# Patient Record
Sex: FEMALE | Race: WHITE | NOT HISPANIC OR LATINO | URBAN - METROPOLITAN AREA
[De-identification: names, ages, dates, MRNs, and addresses within clinical notes are randomized per-mention and may not be internally consistent; named-entity substitution may affect disease eponyms.]

---

## 2024-09-24 ENCOUNTER — TELEPHONE ENCOUNTER (OUTPATIENT)
Dept: URBAN - METROPOLITAN AREA CLINIC 54 | Facility: CLINIC | Age: 59
End: 2024-09-24

## 2024-10-03 ENCOUNTER — OFFICE VISIT (OUTPATIENT)
Dept: URBAN - METROPOLITAN AREA CLINIC 54 | Facility: CLINIC | Age: 59
End: 2024-10-03

## 2024-10-03 RX ORDER — NALTREXONE HYDROCHLORIDE 50 MG/1
1 TABLET TABLET, FILM COATED ORAL ONCE A DAY
Status: ACTIVE | COMMUNITY

## 2024-10-03 RX ORDER — ESCITALOPRAM OXALATE 20 MG/1
1 TABLET TABLET, FILM COATED ORAL ONCE A DAY
Status: ACTIVE | COMMUNITY

## 2024-10-03 RX ORDER — LOSARTAN POTASSIUM 50 MG/1
1 TABLET TABLET, FILM COATED ORAL ONCE A DAY
Status: ACTIVE | COMMUNITY

## 2024-10-11 ENCOUNTER — DASHBOARD ENCOUNTERS (OUTPATIENT)
Age: 59
End: 2024-10-11

## 2024-10-11 ENCOUNTER — LAB OUTSIDE AN ENCOUNTER (OUTPATIENT)
Dept: URBAN - METROPOLITAN AREA CLINIC 54 | Facility: CLINIC | Age: 59
End: 2024-10-11

## 2024-10-11 ENCOUNTER — OFFICE VISIT (OUTPATIENT)
Dept: URBAN - METROPOLITAN AREA CLINIC 54 | Facility: CLINIC | Age: 59
End: 2024-10-11
Payer: COMMERCIAL

## 2024-10-11 VITALS
HEIGHT: 65 IN | TEMPERATURE: 98.4 F | WEIGHT: 274 LBS | HEART RATE: 100 BPM | SYSTOLIC BLOOD PRESSURE: 132 MMHG | BODY MASS INDEX: 45.65 KG/M2 | DIASTOLIC BLOOD PRESSURE: 81 MMHG

## 2024-10-11 DIAGNOSIS — Z09 ENCOUNTER FOR COLONOSCOPY FOLLOWING COLON POLYP REMOVAL: ICD-10-CM

## 2024-10-11 DIAGNOSIS — Z86.0100 HISTORY OF COLON POLYPS: ICD-10-CM

## 2024-10-11 DIAGNOSIS — Z80.0 FAMILY HISTORY OF COLON CANCER IN MOTHER: ICD-10-CM

## 2024-10-11 PROCEDURE — 99243 OFF/OP CNSLTJ NEW/EST LOW 30: CPT

## 2024-10-11 PROCEDURE — 99203 OFFICE O/P NEW LOW 30 MIN: CPT

## 2024-10-11 RX ORDER — NALTREXONE HYDROCHLORIDE 50 MG/1
1 TABLET TABLET, FILM COATED ORAL ONCE A DAY
Status: ACTIVE | COMMUNITY

## 2024-10-11 RX ORDER — ESCITALOPRAM OXALATE 20 MG/1
1 TABLET TABLET, FILM COATED ORAL ONCE A DAY
Status: ACTIVE | COMMUNITY

## 2024-10-11 RX ORDER — SOD SULF/POT CHLORIDE/MAG SULF 1.479 G
12 TABLETS THE FIRST DOSE THE EVENING BEFORE AND SECOND DOSE THE MORNING OF COLONOSCOPY TABLET ORAL TWICE A DAY
Qty: 24 | Refills: 0 | OUTPATIENT
Start: 2024-10-11 | End: 2024-10-12

## 2024-10-11 RX ORDER — LOSARTAN POTASSIUM 50 MG/1
1 TABLET TABLET, FILM COATED ORAL ONCE A DAY
Status: ACTIVE | COMMUNITY

## 2024-10-11 NOTE — HPI-TODAY'S VISIT:
10/11/24: Patient is a 60 yo female with a PMH of HTN and anxiety/depression who was referred by [facility] for colon cancer screening. A copy of this document will be sent to the provider. Last colonoscopy was in Nov 2011 with Dr. Susan Copeland, significant for a small rectal polyp - serrated adenoma. Mother has hx of colon cancer diagnosed around age 78 (alive). Pt denies any symptoms of concern. No changes in bowel habits, melena, rectal bleeding, abdominal pain, or unintentional weight loss. No cardiopulmonary disease, no blood thinners.

## 2024-12-23 ENCOUNTER — OFFICE VISIT (OUTPATIENT)
Dept: URBAN - METROPOLITAN AREA SURGERY CENTER 14 | Facility: SURGERY CENTER | Age: 59
End: 2024-12-23

## 2025-06-18 ENCOUNTER — LAB OUTSIDE AN ENCOUNTER (OUTPATIENT)
Dept: URBAN - METROPOLITAN AREA SURGERY CENTER 14 | Facility: SURGERY CENTER | Age: 60
End: 2025-06-18

## 2025-06-25 ENCOUNTER — OFFICE VISIT (OUTPATIENT)
Dept: URBAN - METROPOLITAN AREA SURGERY CENTER 14 | Facility: SURGERY CENTER | Age: 60
End: 2025-06-25

## 2025-07-11 ENCOUNTER — TELEPHONE ENCOUNTER (OUTPATIENT)
Dept: URBAN - METROPOLITAN AREA CLINIC 54 | Facility: CLINIC | Age: 60
End: 2025-07-11

## 2025-07-15 ENCOUNTER — TELEPHONE ENCOUNTER (OUTPATIENT)
Dept: URBAN - METROPOLITAN AREA CLINIC 54 | Facility: CLINIC | Age: 60
End: 2025-07-15

## 2025-08-13 ENCOUNTER — CLAIMS CREATED FROM THE CLAIM WINDOW (OUTPATIENT)
Dept: URBAN - METROPOLITAN AREA SURGERY CENTER 14 | Facility: SURGERY CENTER | Age: 60
End: 2025-08-13
Payer: COMMERCIAL

## 2025-08-13 ENCOUNTER — CLAIMS CREATED FROM THE CLAIM WINDOW (OUTPATIENT)
Dept: URBAN - METROPOLITAN AREA CLINIC 4 | Facility: CLINIC | Age: 60
End: 2025-08-13
Payer: COMMERCIAL

## 2025-08-13 DIAGNOSIS — Z09 ENCOUNTER FOR FOLLOW-UP EXAMINATION AFTER COMPLETED TREATMENT FOR CONDITIONS OTHER THAN MALIGNANT NEOPLASM: ICD-10-CM

## 2025-08-13 DIAGNOSIS — K63.89 OTHER SPECIFIED DISEASES OF INTESTINE: ICD-10-CM

## 2025-08-13 DIAGNOSIS — Z80.0 FAMILY HISTORY OF COLON CANCER: ICD-10-CM

## 2025-08-13 DIAGNOSIS — Z80.0 FAMILY HISTORY OF COLON CANCER IN MOTHER: ICD-10-CM

## 2025-08-13 DIAGNOSIS — Z86.0100 HISTORY OF COLON POLYPS: ICD-10-CM

## 2025-08-13 DIAGNOSIS — Z86.0100 PERSONAL HISTORY OF COLON POLYPS, UNSPECIFIED: ICD-10-CM

## 2025-08-13 PROCEDURE — 45380 COLONOSCOPY AND BIOPSY: CPT | Performed by: INTERNAL MEDICINE

## 2025-08-13 PROCEDURE — 0529F INTRVL 3/>YR PTS CLNSCP DOCD: CPT | Performed by: INTERNAL MEDICINE

## 2025-08-13 PROCEDURE — 00811 ANES LWR INTST NDSC NOS: CPT | Performed by: NURSE ANESTHETIST, CERTIFIED REGISTERED

## 2025-08-13 PROCEDURE — 88305 TISSUE EXAM BY PATHOLOGIST: CPT | Performed by: PATHOLOGY

## 2025-08-13 RX ORDER — ESCITALOPRAM OXALATE 20 MG/1
1 TABLET TABLET, FILM COATED ORAL ONCE A DAY
Status: ACTIVE | COMMUNITY

## 2025-08-13 RX ORDER — LOSARTAN POTASSIUM 50 MG/1
1 TABLET TABLET, FILM COATED ORAL ONCE A DAY
Status: ACTIVE | COMMUNITY

## 2025-08-13 RX ORDER — NALTREXONE HYDROCHLORIDE 50 MG/1
1 TABLET TABLET, FILM COATED ORAL ONCE A DAY
Status: ACTIVE | COMMUNITY